# Patient Record
Sex: FEMALE | HISPANIC OR LATINO | Employment: UNEMPLOYED | URBAN - METROPOLITAN AREA
[De-identification: names, ages, dates, MRNs, and addresses within clinical notes are randomized per-mention and may not be internally consistent; named-entity substitution may affect disease eponyms.]

---

## 2024-05-31 ENCOUNTER — TELEPHONE (OUTPATIENT)
Dept: OPHTHALMOLOGY | Facility: CLINIC | Age: 79
End: 2024-05-31

## 2024-05-31 ENCOUNTER — OFFICE VISIT (OUTPATIENT)
Dept: OPHTHALMOLOGY | Facility: CLINIC | Age: 79
End: 2024-05-31
Attending: OPTOMETRIST

## 2024-05-31 DIAGNOSIS — H40.20X3 ANGLE-CLOSURE GLAUCOMA, SEVERE STAGE: Primary | ICD-10-CM

## 2024-05-31 PROCEDURE — 92002 INTRM OPH EXAM NEW PATIENT: CPT | Performed by: OPTOMETRIST

## 2024-05-31 PROCEDURE — 99213 OFFICE O/P EST LOW 20 MIN: CPT | Performed by: OPTOMETRIST

## 2024-05-31 RX ORDER — DORZOLAMIDE HYDROCHLORIDE AND TIMOLOL MALEATE 20; 5 MG/ML; MG/ML
1 SOLUTION/ DROPS OPHTHALMIC 2 TIMES DAILY
Qty: 5 ML | Refills: 5 | Status: SHIPPED | OUTPATIENT
Start: 2024-05-31

## 2024-05-31 RX ORDER — LATANOPROST 50 UG/ML
1 SOLUTION/ DROPS OPHTHALMIC AT BEDTIME
Qty: 5 ML | Refills: 5 | Status: SHIPPED | OUTPATIENT
Start: 2024-05-31

## 2024-05-31 RX ORDER — ACETAZOLAMIDE 500 MG/1
500 CAPSULE, EXTENDED RELEASE ORAL 2 TIMES DAILY
Qty: 24 CAPSULE | Refills: 5 | Status: SHIPPED | OUTPATIENT
Start: 2024-05-31

## 2024-05-31 RX ORDER — BRIMONIDINE TARTRATE 2 MG/ML
1 SOLUTION/ DROPS OPHTHALMIC 3 TIMES DAILY
Qty: 5 ML | Refills: 11 | Status: SHIPPED | OUTPATIENT
Start: 2024-05-31

## 2024-05-31 RX ORDER — DORZOLAMIDE HYDROCHLORIDE AND TIMOLOL MALEATE 20; 5 MG/ML; MG/ML
1 SOLUTION/ DROPS OPHTHALMIC 2 TIMES DAILY
COMMUNITY

## 2024-05-31 ASSESSMENT — SLIT LAMP EXAM - LIDS
COMMENTS: NORMAL
COMMENTS: NORMAL

## 2024-05-31 ASSESSMENT — CONF VISUAL FIELD
OS_SUPERIOR_NASAL_RESTRICTION: 1
OD_NORMAL: 1
OD_INFERIOR_NASAL_RESTRICTION: 0
OD_INFERIOR_TEMPORAL_RESTRICTION: 0
METHOD: COUNTING FINGERS
OS_SUPERIOR_TEMPORAL_RESTRICTION: 1
OS_INFERIOR_TEMPORAL_RESTRICTION: 1
OS_INFERIOR_NASAL_RESTRICTION: 1
OD_SUPERIOR_TEMPORAL_RESTRICTION: 0
OD_SUPERIOR_NASAL_RESTRICTION: 0

## 2024-05-31 ASSESSMENT — VISUAL ACUITY
METHOD: SNELLEN - LINEAR
CORRECTION_TYPE: GLASSES
OS_CC: NLP
OD_CC+: -2
OD_CC: 20/50

## 2024-05-31 ASSESSMENT — TONOMETRY
OS_IOP_MMHG: 70
IOP_METHOD: TONOPEN
IOP_METHOD: TONOPEN
OS_IOP_MMHG: 50
OS_IOP_MMHG: 68
OD_IOP_MMHG: 24
IOP_METHOD: APPLANATION

## 2024-05-31 ASSESSMENT — EXTERNAL EXAM - RIGHT EYE: OD_EXAM: NORMAL

## 2024-05-31 ASSESSMENT — EXTERNAL EXAM - LEFT EYE: OS_EXAM: NORMAL

## 2024-05-31 NOTE — TELEPHONE ENCOUNTER
[facility] [phone]     Pt arrived to [place] per family about 2 weeks ago and about that time started having headache pain/eye pain and vision loss in the left eye.    Pt at eye visit today and IOP left eye 58-- concern of angle closure glaucoma with cornea edema and dense cataract    No light perception at visit today    Right eye with IOP of 28    3 rounds of dorzolamide/timolol left eye given at visit today-- last dose around 1500    One dose in right eye.    Pt not on any eye drops prior to visit    Pt will bring eye note to visit    Pt scheduled with Dr. Olmedo for in clinic visit and Dr. Riley/amanda able to see pt also today following lecture.    David Mishra RN 1:31 PM 05/31/24

## 2024-05-31 NOTE — PATIENT INSTRUCTIONS
320  mg diamox and Cosopt and Brimonidine given     Pt started on full regiment of POAG drops including 500 mg of Diamox  Pressures slightly decrease but still elevated  Pt reports pain and loss of vision for over 2 weeks and noticed changes in vision and comfort for over 6 weeks  Cont full treatment   RTC early next week Tuesday

## 2024-05-31 NOTE — PROGRESS NOTES
Assessment & Plan       Carlene Chau is a 79 year old female with the following diagnoses:   1. Angle-closure glaucoma, severe stage          Pt started on full regiment of POAG drops including 500 mg of Diamox  Pressures slightly decrease but still elevated  Pt reports pain and loss of vision for over 2 weeks and noticed changes in vision and comfort for over 6 weeks  Cont full treatment   RTC early next week Tuesday    Patient disposition:   No follow-ups on file.          Complete documentation of historical and exam elements from today's encounter can be found in the full encounter summary report (not reduplicated in this progress note). I personally obtained the chief complaint(s) and history of present illness.  I confirmed and edited as necessary the review of systems, past medical/surgical history, family history, social history, and examination findings as documented by others; and I examined the patient myself. I personally reviewed the relevant tests, images, and reports as documented above. I formulated and edited as necessary the assessment and plan and discussed the findings and management plan with the patient and family.  Dr. Rudolph Olmedo

## 2024-06-04 ENCOUNTER — OFFICE VISIT (OUTPATIENT)
Dept: OPHTHALMOLOGY | Facility: CLINIC | Age: 79
End: 2024-06-04
Attending: OPHTHALMOLOGY

## 2024-06-04 DIAGNOSIS — H40.52X4 NEOVASCULAR GLAUCOMA OF LEFT EYE, INDETERMINATE STAGE: ICD-10-CM

## 2024-06-04 DIAGNOSIS — H40.001 GLAUCOMA SUSPECT, RIGHT: ICD-10-CM

## 2024-06-04 DIAGNOSIS — H34.8121 CENTRAL RETINAL VEIN OCCLUSION WITH NEOVASCULARIZATION OF LEFT EYE (H): ICD-10-CM

## 2024-06-04 DIAGNOSIS — H40.212 ACUTE ANGLE-CLOSURE GLAUCOMA, LEFT EYE: Primary | ICD-10-CM

## 2024-06-04 DIAGNOSIS — H25.13 NUCLEAR SCLEROTIC CATARACT OF BOTH EYES: ICD-10-CM

## 2024-06-04 PROCEDURE — 92250 FUNDUS PHOTOGRAPHY W/I&R: CPT | Performed by: STUDENT IN AN ORGANIZED HEALTH CARE EDUCATION/TRAINING PROGRAM

## 2024-06-04 PROCEDURE — 99213 OFFICE O/P EST LOW 20 MIN: CPT | Performed by: STUDENT IN AN ORGANIZED HEALTH CARE EDUCATION/TRAINING PROGRAM

## 2024-06-04 PROCEDURE — 92134 CPTRZ OPH DX IMG PST SGM RTA: CPT | Mod: 26 | Performed by: STUDENT IN AN ORGANIZED HEALTH CARE EDUCATION/TRAINING PROGRAM

## 2024-06-04 PROCEDURE — 92134 CPTRZ OPH DX IMG PST SGM RTA: CPT | Performed by: STUDENT IN AN ORGANIZED HEALTH CARE EDUCATION/TRAINING PROGRAM

## 2024-06-04 PROCEDURE — 99207 PR BUNDLED PROCEDURE IN GLOBAL PKG: CPT | Mod: 26 | Performed by: STUDENT IN AN ORGANIZED HEALTH CARE EDUCATION/TRAINING PROGRAM

## 2024-06-04 PROCEDURE — 99203 OFFICE O/P NEW LOW 30 MIN: CPT | Mod: GC | Performed by: STUDENT IN AN ORGANIZED HEALTH CARE EDUCATION/TRAINING PROGRAM

## 2024-06-04 ASSESSMENT — REFRACTION_WEARINGRX
OS_CYLINDER: +0.75
OD_SPHERE: +1.75
OD_AXIS: 109
SPECS_TYPE: BIFOCAL
OS_ADD: +2.75
OD_CYLINDER: +2.50
OS_AXIS: 040
OS_SPHERE: +2.25
OD_ADD: +2.75

## 2024-06-04 ASSESSMENT — CONF VISUAL FIELD
METHOD: COUNTING FINGERS
OD_INFERIOR_TEMPORAL_RESTRICTION: 0
OS_SUPERIOR_NASAL_RESTRICTION: 1
OD_INFERIOR_NASAL_RESTRICTION: 0
OS_INFERIOR_TEMPORAL_RESTRICTION: 1
OD_SUPERIOR_NASAL_RESTRICTION: 0
OS_SUPERIOR_TEMPORAL_RESTRICTION: 1
OD_NORMAL: 1
OD_SUPERIOR_TEMPORAL_RESTRICTION: 0
OS_INFERIOR_NASAL_RESTRICTION: 1

## 2024-06-04 ASSESSMENT — TONOMETRY
OS_IOP_MMHG: 35
IOP_METHOD: APPLANATION
OD_IOP_MMHG: 08

## 2024-06-04 ASSESSMENT — SLIT LAMP EXAM - LIDS
COMMENTS: NORMAL
COMMENTS: NORMAL

## 2024-06-04 ASSESSMENT — VISUAL ACUITY
OD_CC+: -1
OS_CC: NLP
METHOD: SNELLEN - LINEAR
CORRECTION_TYPE: GLASSES
OD_CC: 20/60

## 2024-06-04 ASSESSMENT — CUP TO DISC RATIO: OD_RATIO: 0.4

## 2024-06-04 ASSESSMENT — EXTERNAL EXAM - RIGHT EYE: OD_EXAM: NORMAL

## 2024-06-04 ASSESSMENT — EXTERNAL EXAM - LEFT EYE: OS_EXAM: NORMAL

## 2024-06-04 NOTE — PROGRESS NOTES
Ophthalmology Acute Clinic     Chief Complaint(s) and History of Present Illness(es)       Follow Up     Additional comments: Angle Closure             Comments    Pt here with her grandaughter and daughter today.  Pt states vision is slightly better in LE, seeing white lines now. Pt reports being sensitive to light. Constant pressure sensation with stinging and burning in LE. No DM.    JAMAL Sena June 4, 2024 9:35 AM                           HPI:   Carlene Snellidad is a 79 year old female who presents for evaluation of angle closure in the left eye. Seen by Dr. Olmedo on 5/31/24 and started on max drops and diamox.       PMH:   History reviewed. No pertinent past medical history.    PSHx:   History reviewed. No pertinent surgical history.    Meds:   Current Outpatient Medications   Medication Instructions    acetaZOLAMIDE (DIAMOX SEQUEL) 500 mg, Oral, 2 TIMES DAILY    brimonidine (ALPHAGAN) 0.2 % ophthalmic solution 1 drop, Both Eyes, 3 TIMES DAILY    dorzolamide-timolol (COSOPT) 2-0.5 % ophthalmic solution 1 drop, 2 TIMES DAILY    dorzolamide-timolol (COSOPT) 2-0.5 % ophthalmic solution 1 drop, Both Eyes, 2 TIMES DAILY    latanoprost (XALATAN) 0.005 % ophthalmic solution 1 drop, Both Eyes, AT BEDTIME       Review of systems for the eyes was negative other than the pertinent positives/negatives listed in the HPI.      Imaging:  OCT macula:   RE: normal foveal contour. No IRF or SRF  LE: inner retinal thickening with increased hyperreflectivity     Optos 06/04/24 :   RE: normal  LE: Diffuse intraretinal hemorrhages    Gonio 06/04/24   RE: open to trabecular meshwork  LE: NVA    Assessment & Plan      Carlene CAIN [facility] is a 79 year old female with the following diagnoses:   1. Angle-closure glaucoma, severe stage    2. Neovascular glaucoma of left eye, indeterminate stage    3. 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 Chau    PLAN:   - continue diamox 500 mg BID  - continue cosopt bid , brimonidine bid, latanoprost os   - follow-up in retina 2 weeks  - establish with pcp - [facility] clinic     # cataracts ou   - monitor     # glaucoma suspect od   - suspect due to cupping  - consider oct rnfl in future    Patient disposition:   Return in about 2 weeks (around 6/18/2024).    Patient seen with Dr. Vianey Zendejas MD  Resident Physician, PGY-4  Department of Ophthalmology  06/04/24 9:58 AM

## 2024-06-04 NOTE — NURSING NOTE
Chief Complaints and History of Present Illnesses   Patient presents with    Follow Up     Angle Closure     Chief Complaint(s) and History of Present Illness(es)       Follow Up              Comments: Angle Closure              Comments    Pt here with her grandaughter and daughter today.  Pt states vision is slightly better in LE, seeing white lines now. Pt reports being sensitive to light. Constant pressure sensation with stinging and burning in LE. No DM.    JAMAL Sena June 4, 2024 9:35 AM

## 2024-06-10 NOTE — PROGRESS NOTES
ATTESTATION     Attending Physician Attestation:      Complete documentation of historical and exam elements from today's encounter can be found in the full encounter summary report (not reduplicated in this progress note).  I personally obtained the chief complaint(s) and history of present illness.  I confirmed and edited as necessary the review of systems, past medical/surgical history, family history, social history, and examination findings as documented by others; and I examined the patient myself.  I personally reviewed the relevant tests, images, and reports as documented above.  I personally reviewed the ophthalmic test(s) associated with this encounter, agree with the interpretation(s) as documented by the resident/fellow, and have edited the corresponding report(s) as necessary.   I formulated and edited as necessary the assessment and plan and discussed the findings and management plan with the patient and family    Francisco López MD  PGY-5 Vitreo-retina surgery Fellow  Department of Ophthalmology   [facility]

## 2024-06-17 ENCOUNTER — OFFICE VISIT (OUTPATIENT)
Dept: OPHTHALMOLOGY | Facility: CLINIC | Age: 79
End: 2024-06-17

## 2024-06-17 ENCOUNTER — TELEPHONE (OUTPATIENT)
Dept: OPHTHALMOLOGY | Facility: CLINIC | Age: 79
End: 2024-06-17

## 2024-06-17 DIAGNOSIS — H25.13 NUCLEAR SCLEROTIC CATARACT OF BOTH EYES: ICD-10-CM

## 2024-06-17 DIAGNOSIS — H43.823 VITREOMACULAR ADHESION OF BOTH EYES: ICD-10-CM

## 2024-06-17 DIAGNOSIS — H34.8122 CENTRAL RETINAL VEIN OCCLUSION OF LEFT EYE, UNSPECIFIED COMPLICATION STATUS (H): ICD-10-CM

## 2024-06-17 DIAGNOSIS — H40.52X4 NEOVASCULAR GLAUCOMA OF LEFT EYE, INDETERMINATE STAGE: Primary | ICD-10-CM

## 2024-06-17 PROCEDURE — 92235 FLUORESCEIN ANGRPH MLTIFRAME: CPT

## 2024-06-17 PROCEDURE — 99212 OFFICE O/P EST SF 10 MIN: CPT | Mod: 25

## 2024-06-17 PROCEDURE — 99214 OFFICE O/P EST MOD 30 MIN: CPT

## 2024-06-17 PROCEDURE — 92250 FUNDUS PHOTOGRAPHY W/I&R: CPT

## 2024-06-17 ASSESSMENT — CONF VISUAL FIELD
METHOD: COUNTING FINGERS
OS_INFERIOR_NASAL_RESTRICTION: 1
OD_INFERIOR_NASAL_RESTRICTION: 0
OD_SUPERIOR_TEMPORAL_RESTRICTION: 0
OS_INFERIOR_TEMPORAL_RESTRICTION: 1
OD_INFERIOR_TEMPORAL_RESTRICTION: 0
OS_SUPERIOR_NASAL_RESTRICTION: 1
OD_SUPERIOR_NASAL_RESTRICTION: 0
OD_NORMAL: 1
OS_SUPERIOR_TEMPORAL_RESTRICTION: 1

## 2024-06-17 ASSESSMENT — SLIT LAMP EXAM - LIDS
COMMENTS: NORMAL
COMMENTS: NORMAL

## 2024-06-17 ASSESSMENT — EXTERNAL EXAM - RIGHT EYE: OD_EXAM: NORMAL

## 2024-06-17 ASSESSMENT — TONOMETRY
IOP_METHOD: TONOPEN
OD_IOP_MMHG: 17
OS_IOP_MMHG: 24

## 2024-06-17 ASSESSMENT — VISUAL ACUITY
OD_CC+: -1
OD_CC: 20/40
CORRECTION_TYPE: GLASSES
OS_CC: LP
METHOD: SNELLEN - LINEAR

## 2024-06-17 ASSESSMENT — CUP TO DISC RATIO: OD_RATIO: 0.4

## 2024-06-17 ASSESSMENT — EXTERNAL EXAM - LEFT EYE: OS_EXAM: NORMAL

## 2024-06-17 NOTE — NURSING NOTE
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 She reports a small amount of blurriness in both eyes and flashes in the left eye. She reports a sharp pain that comes from her foot to her left eye that has been happening for 2-3 years. She also notes shaking in her hands and a feeling of static electricity like when you've been shocked by something. Sometimes the left eye will get cloudy or blurry and then come back again. This happens a few times a day. She sometimes has a pressure headache in the back of her head, and she has one now that just started.     Pt's son-in-law is interpreting. Pt would appreciate a referral to a Ivorian-speaking mental health professional, as she recently moved here from [place] and then had her eye issues and could use someone to talk to.     Pt has not had new glasses in 4 years. She and the family would like to know of a new refraction would be helpful.     Khalida Villarreal OA 2:49 PM June 17, 2024

## 2024-06-17 NOTE — NURSING NOTE
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 She reports a small amount of blurriness in both eyes and flashes in the left eye. She reports a sharp pain that comes from her foot to her left eye that has been happening for 2-3 years. She also notes shaking in her hands and a feeling of static electricity like when you've been shocked by something. Sometimes the left eye will get cloudy or blurry and then come back again. This happens a few times a day. She sometimes has a pressure headache in the back of her head, and she has one now that just started.     Pt's son-in-law is interpreting. Pt would appreciate a referral to a Jamaican-speaking mental health professional, as she recently moved here from [place] and then had her eye issues and could use someone to talk to.     Khalida Villarreal OA 2:49 PM June 17, 2024

## 2024-06-17 NOTE — TELEPHONE ENCOUNTER
It is medically urgent/emergent for this pt to receive an intravitreal injection of Avastin in her left eye today with Dr. Dwight Rashid in order to preserve eyesight.    TIERNEY Tyson 3:55 PM June 17, 2024

## 2024-06-18 ENCOUNTER — LAB REQUISITION (OUTPATIENT)
Dept: LAB | Facility: CLINIC | Age: 79
End: 2024-06-18

## 2024-06-18 DIAGNOSIS — E11.9 TYPE 2 DIABETES MELLITUS WITHOUT COMPLICATIONS (H): ICD-10-CM

## 2024-06-18 PROCEDURE — 80061 LIPID PANEL: CPT | Performed by: INTERNAL MEDICINE

## 2024-06-18 PROCEDURE — 80053 COMPREHEN METABOLIC PANEL: CPT | Performed by: INTERNAL MEDICINE

## 2024-06-19 LAB
ALBUMIN SERPL BCG-MCNC: 4.5 G/DL (ref 3.5–5.2)
ALP SERPL-CCNC: 91 U/L (ref 40–150)
ALT SERPL W P-5'-P-CCNC: 15 U/L (ref 0–50)
ANION GAP SERPL CALCULATED.3IONS-SCNC: 10 MMOL/L (ref 7–15)
AST SERPL W P-5'-P-CCNC: 22 U/L (ref 0–45)
BILIRUB SERPL-MCNC: 0.2 MG/DL
BUN SERPL-MCNC: 26.6 MG/DL (ref 8–23)
CALCIUM SERPL-MCNC: 9.4 MG/DL (ref 8.8–10.2)
CHLORIDE SERPL-SCNC: 103 MMOL/L (ref 98–107)
CHOLEST SERPL-MCNC: 180 MG/DL
CREAT SERPL-MCNC: 1 MG/DL (ref 0.51–0.95)
DEPRECATED HCO3 PLAS-SCNC: 25 MMOL/L (ref 22–29)
EGFRCR SERPLBLD CKD-EPI 2021: 57 ML/MIN/1.73M2
FASTING STATUS PATIENT QL REPORTED: NO
FASTING STATUS PATIENT QL REPORTED: NO
GLUCOSE SERPL-MCNC: 108 MG/DL (ref 70–99)
HDLC SERPL-MCNC: 68 MG/DL
LDLC SERPL CALC-MCNC: 85 MG/DL
NONHDLC SERPL-MCNC: 112 MG/DL
POTASSIUM SERPL-SCNC: 5.3 MMOL/L (ref 3.4–5.3)
PROT SERPL-MCNC: 7.3 G/DL (ref 6.4–8.3)
SODIUM SERPL-SCNC: 138 MMOL/L (ref 135–145)
TRIGL SERPL-MCNC: 134 MG/DL

## 2024-06-24 ENCOUNTER — OFFICE VISIT (OUTPATIENT)
Dept: OPHTHALMOLOGY | Facility: CLINIC | Age: 79
End: 2024-06-24

## 2024-06-24 DIAGNOSIS — H40.52X4 NEOVASCULAR GLAUCOMA OF LEFT EYE, INDETERMINATE STAGE: Primary | ICD-10-CM

## 2024-06-24 PROCEDURE — 99213 OFFICE O/P EST LOW 20 MIN: CPT

## 2024-06-24 PROCEDURE — T1013 SIGN LANG/ORAL INTERPRETER: HCPCS | Mod: U4

## 2024-06-24 PROCEDURE — 99211 OFF/OP EST MAY X REQ PHY/QHP: CPT

## 2024-06-24 PROCEDURE — 92133 CPTRZD OPH DX IMG PST SGM ON: CPT

## 2024-06-24 RX ORDER — ACETAZOLAMIDE 250 MG/1
250 TABLET ORAL DAILY
Qty: 5 TABLET | Refills: 1 | Status: SHIPPED | OUTPATIENT
Start: 2024-06-24 | End: 2024-08-14

## 2024-06-24 RX ORDER — ACETAZOLAMIDE 250 MG/1
250 TABLET ORAL ONCE
Status: ACTIVE | OUTPATIENT
Start: 2024-06-24

## 2024-06-24 RX ORDER — ACETAZOLAMIDE 125 MG/1
125 TABLET ORAL ONCE
Status: ACTIVE | OUTPATIENT
Start: 2024-06-24

## 2024-06-24 ASSESSMENT — TONOMETRY
OD_IOP_MMHG: 17
OS_IOP_MMHG: 34
IOP_METHOD: TONOPEN

## 2024-06-24 ASSESSMENT — EXTERNAL EXAM - RIGHT EYE: OD_EXAM: NORMAL

## 2024-06-24 ASSESSMENT — VISUAL ACUITY
OD_CC: 40-2
METHOD: SNELLEN - LINEAR
OS_CC: LP

## 2024-06-24 ASSESSMENT — SLIT LAMP EXAM - LIDS
COMMENTS: NORMAL
COMMENTS: NORMAL

## 2024-06-24 ASSESSMENT — CUP TO DISC RATIO: OD_RATIO: 0.4

## 2024-06-24 ASSESSMENT — CONF VISUAL FIELD
OS_INFERIOR_NASAL_RESTRICTION: 1
OS_INFERIOR_TEMPORAL_RESTRICTION: 1
OS_SUPERIOR_NASAL_RESTRICTION: 1
OS_SUPERIOR_TEMPORAL_RESTRICTION: 1

## 2024-06-24 ASSESSMENT — EXTERNAL EXAM - LEFT EYE: OS_EXAM: NORMAL

## 2024-06-24 NOTE — PROGRESS NOTES
Interp: Tammy    CC: vision loss >1  month ago     HPI: ocular ischemia with neovascular glaucoma OS on diamox     PMHx:   HTN  DL   DM??      Imaging:  OCT: 06/24/2024  Right eye: Good foveal contour, no IRF/SRF , trace VMT  Left eye: thinned retina, distorted lamination, VMT with possible cystoid changes, no mary IRF/SRF       RNFL:6/24/24  OD: diffuse thinning   OS: wnl     Retina Laser procedures:  none    Intravitreal injections:  none    Assessment/ Plan: 06/24/2024          # ischemic Central retinal vein occlusion  left eye (H28), no macula edema   # Angle-closure glaucoma, severe stage LEFT eye  # ocular ischemic syndrome, possible LEFT eye   - onset of vision loss was 1 month prior   - 8 days ago started to develop worsening pain in the left eye  - no light perception vision in the left eye likely due to prolonged period of high iop os   - 5/31 presented with iop of 70 os and started on mmt and diamox, the patient was not given an injection due to high cost last visit.  - on exam today there is anterior and posterior synechiae, corneal Kps mainly in artle's triangle, trace flare (post dilation) and no cells in AC or vitreous.  View to the back is limited by poor dilation and significant cataract however there is no clear evidence of NVD or NVE  FA was done which was also limited however also did not show evidence of NVDs, timing on FA is not reliable to cannot assess fill time, or transit time, however there does seem to be peripheral non-perfusion. 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 Patient denies headache, jaw claudication or scalp  "tenderness. No hx of myalgia.  I recommend that we proceed with ischemic work up: US carotids, echo cardio and controlling risk factors: HTN and DL, and screening for DM.   ESR and CRP ordered last visit however not done, echo and US are also pending (they are waiting for their appointment at [facility] to get a cardiovascular work up). She has been recently started on Amplodipine to treat her BP  IOP today is on the higher side. Re-initiate diamox and schedule apt with glaucoma for second opinion wether neovascular glaucoma vs phacomorphic/uveitic. If glaucoma think there is a neovascular component then will go ahead with anti-VEGF/PRP    # Trace VMT OU   - not visually significant OU  - Observe for now  - No retinal holes or breaks seen on fundus exam today  - RD warning signs explained   - patient knows to come in if any change in symptoms    # Anomalous optic disc OU   # glaucoma suspect OD   - cupping   - thinned RNFL   - consult glaucoma, might need treatment    # cataracts ou   -visually significant OU . Synechiae OS  - will monitor for now    125 mg acetazolamide (Diamox) pulled from \"emergency use only bin\" administered at 5:15 PM - IOP at 5:30 PM was 25 by Efren Rashid MD     Medical Retina  [facility]     Attending Physician Attestation:  Complete documentation of historical and exam elements from today's encounter can be found in the full encounter summary report (not reduplicated in this progress note).  I personally obtained the chief complaint(s) and history of present illness.  I confirmed and edited as necessary the review of systems, past medical/surgical history, family history, social history, and examination findings as documented by others; and I examined the patient myself.  I personally reviewed the relevant tests, images, and reports as documented above.  I formulated and edited as necessary the assessment and plan and discussed the findings and " management plan with the patient and family. Gato Rashid MD

## 2024-06-25 DIAGNOSIS — H40.20X3 ANGLE-CLOSURE GLAUCOMA, SEVERE STAGE: Primary | ICD-10-CM

## 2024-06-27 ENCOUNTER — OFFICE VISIT (OUTPATIENT)
Dept: OPHTHALMOLOGY | Facility: CLINIC | Age: 79
End: 2024-06-27
Attending: OPHTHALMOLOGY

## 2024-06-27 DIAGNOSIS — H40.52X3 NEOVASCULAR GLAUCOMA OF LEFT EYE, SEVERE STAGE: ICD-10-CM

## 2024-06-27 DIAGNOSIS — H34.8121 CENTRAL RETINAL VEIN OCCLUSION WITH NEOVASCULARIZATION OF LEFT EYE (H): Primary | ICD-10-CM

## 2024-06-27 PROCEDURE — 99214 OFFICE O/P EST MOD 30 MIN: CPT | Mod: GC | Performed by: OPHTHALMOLOGY

## 2024-06-27 PROCEDURE — 92083 EXTENDED VISUAL FIELD XM: CPT | Performed by: OPHTHALMOLOGY

## 2024-06-27 PROCEDURE — 99211 OFF/OP EST MAY X REQ PHY/QHP: CPT | Performed by: OPHTHALMOLOGY

## 2024-06-27 ASSESSMENT — TONOMETRY
OD_IOP_MMHG: 14
OD_IOP_MMHG: 12
OS_IOP_MMHG: 22
IOP_METHOD: APPLANATION
OS_IOP_MMHG: 21
IOP_METHOD: TONOPEN

## 2024-06-27 ASSESSMENT — REFRACTION_WEARINGRX
OD_AXIS: 109
OD_CYLINDER: +2.50
OS_ADD: +2.75
OD_ADD: +2.75
SPECS_TYPE: BIFOCAL
OD_SPHERE: +1.75
OS_AXIS: 040
OS_SPHERE: +2.25
OS_CYLINDER: +0.75

## 2024-06-27 ASSESSMENT — VISUAL ACUITY
METHOD: SNELLEN - LINEAR
OD_CC: 20/50
CORRECTION_TYPE: GLASSES
OS_CC: LP

## 2024-06-27 ASSESSMENT — SLIT LAMP EXAM - LIDS
COMMENTS: NORMAL
COMMENTS: NORMAL

## 2024-06-27 ASSESSMENT — EXTERNAL EXAM - LEFT EYE: OS_EXAM: NORMAL

## 2024-06-27 ASSESSMENT — CUP TO DISC RATIO
OD_RATIO: 0.65
OS_RATIO: 0.5

## 2024-06-27 ASSESSMENT — EXTERNAL EXAM - RIGHT EYE: OD_EXAM: NORMAL

## 2024-06-27 ASSESSMENT — PACHYMETRY
OD_CT(UM): 557
OS_CT(UM): 559

## 2024-06-27 NOTE — PATIENT INSTRUCTIONS
Financial Counselor, Marleen Hardy  [phone]    Please call and discuss expense of clinic visits, and financial planning.  We would like to give you an avastin intravitreal injection in your left eye to treat your glaucoma. This can be an expensive medication without insurance. Please call Marleen Hardy to discuss financial assistance options.

## 2024-06-27 NOTE — PROGRESS NOTES
Chief Complaint/Presenting Concern: neovascular glaucoma, left eye    History of Present Illness:   Carlene CAIN [facility] is a 79 year old patient who presents for evaluation of neovascular glaucoma of the left eye as a referral from Dr. Dwight Rashid. 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 [facility], IOP was in 70's and was started on drops and diamox. She followed up with retina service on 6/17/24. FA was completed but not reliable--concerning for CRVO and possible NVD. View limited by significant cataract. 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 Cardiovascular work-up is ongoing. Patient was referred to glaucoma service for evaluation of neovascular glaucoma; if confirmed, Dr. Dwight Rashid would like to proceed with anti-vegf left eye.     Patient is here with family and they are assisting with interpretation. Patient recounts that at the end of April, she experienced flashes in her left eye and then she lost vision over the course of a few days.    Drops on initial evaluation:  Brimonidine TID, both eyes  Cosopt, BID, both eyes  Latanoprost, at bedtime, both eyes  Diamox 125 mg daily    Relevant Past Medical/Family/Social History:  None significant    Relevant Review of Systems: none     Diagnosis: Neovascular glaucoma, left eye, severe stage   Year diagnosis: 2024  Previous glaucoma surgery/laser: none  Maximum intraocular pressure 24/70 mmHg   Current meds: see below  Family history: negative  CCT: 557/559  Gonio:  right eye: open to TM/SS with no NVA  Left eye: Inferior PAS with NVA, other quadrants appear closed and zipped up with scattered NVA  Refractive status: sphere +1.75 & +2.25  Trauma history: Very distant history of eye trauma as a young adult, maybe 40 years ago  Steroid  exposure: negative  Marijuana use and frequency: none  Vasospastic disease: Migrane/Raynaud phenomenon: negative  Meds AEs/intolerance: none  PMHx: Positive for: chronic cough; Negative for : Asthma and respiratory problems/Cardiac/Renal/Kidney stones/Sulfa Allergy  Anticoagulants: none  Previous testing:  OCT Optic Nerve RNFL Spectralis 6/24/24  right eye: diffuse thinning, most severe superior and inferior  left eye: normal thickness, poor tracing    Today's testing:  Visual field June 27, 2024:   Right eye - superior arcuate defect, reliable;   Left eye - unable    Additional Ocular History:    ischemic Central retinal vein occlusion  left eye (H28), no macula edema   Ocular ischemic syndrome, 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 There is intraretinal heme in 4 quadrants suggestive of CRVO however there is no macular edema on OCT     2. Trace VMT each eye   - not visually significant each eye  - Observe for now     3.  cataracts each eye    - Visually significant each eye  - Synechiae left eye     Plan/Recommendations:  Discussed findings with patient. IOP is 14/22 mmHg. Left eye IOP improved from original 60-70mmHg range at presentation on 5/31/24. There is confirmed CRVO on exam, and there is concerning features for neovascularization glaucoma. Exam today shows NVI at pupillary margin within synechiae. 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 KP seen at last exam with Dr. Dwight Rashid is not present today. Visible NVA on gonioscopy with angle closure. Unreliable tracing of RNFL. Low vision potential, and likely has snuffed out optic nerve. Left eye is comfortable; discussed options of CPC to lower IOP to take off diamox and drops.   Agree with need for anti-vegf  Gave patient financial counseling  information for [facility]; can provide estimate for Avastin injection. They will discuss cost and will call clinic back if they want to get avastin here at [facility] (Dr. Dwight Rashid card given to patient).  Plan for TSCPC once patient receives Avastin injection to avoid worsening of neovascularization. Will work on scheduling could consider CPC in the clinic to limit expense of procedure.   Risks and benefits of TSCPC in the left eye, including risks of inflammation, need for additional surgery, failure of surgery, and vision loss were explained to patient and family, who expressed understanding and wishes to proceed with surgery  Can follow-up with Dr. Dwight Rashid once avastin financial planning is finalized.   Continue Brimonidine TID, both eyes  Continue Cosopt, BID, both eyes  Continue Latanoprost, at bedtime, both eyes  Okay to stop Diamox 125 mg daily    Schedule CPC left eye, block      Thank you for entrusting us with your care  Antolin Beaulieu MD, PGY3  Ophthalmology Resident  [facility]    Physician Attestation     Attending Physician Attestation:  Complete documentation of historical and exam elements from today's encounter can be found in the full encounter summary report (not reduplicated in this progress note). I personally obtained the chief complaint(s) and history of present illness. I confirmed and edited as necessary the review of systems, past medical/surgical history, family history, social history, and examination findings as documented by others; and I examined the patient myself. I personally reviewed the relevant tests, images, and reports as documented above. I personally reviewed the ophthalmic test(s) associated with this encounter, agree with the interpretation(s) as documented by the resident/fellow and have edited the corresponding report(s) as necessary. I formulated and edited as necessary the assessment and plan and discussed the findings and management plan with the patient and  any family members present at the time of the visit.  Kathie Parada M.D., Glaucoma, June 27, 2024

## 2024-06-27 NOTE — LETTER
6/27/2024       RE: Carlene Chau  [address] [facility] [address]     Dear Colleague,    Thank you for referring your patient, Carlene Chau, to the [facility] [place] at [facility]. Please see a copy of my visit note below.    Chief Complaint/Presenting Concern: neovascular glaucoma, left eye    History of Present Illness:   Carlene Chau is a 79 year old patient who presents for evaluation of neovascular glaucoma of the left eye as a referral from Dr. Dwight Rashid. 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 At initial presentation in [place] [facility], IOP was in 70's and was started on drops and diamox. She followed up with retina service on 6/17/24. FA was completed but not reliable--concerning for CRVO and possible NVD. View limited by significant cataract. 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 Cardiovascular work-up is ongoing. Patient was referred to glaucoma service for evaluation of neovascular glaucoma; if confirmed, Dr. Dwight Rashid would like to proceed with anti-vegf left eye.     Patient is here with family and they are assisting with interpretation. Patient recounts that at the end of April, she experienced flashes in her left eye and then she lost vision over the course of a few days.    Drops on initial evaluation:  Brimonidine TID, both eyes  Cosopt, BID, both eyes  Latanoprost, at bedtime, both eyes  Diamox 125 mg daily    Relevant Past Medical/Family/Social History:  None significant    Relevant Review of Systems: none     Diagnosis: Neovascular glaucoma, left eye, severe stage   Year diagnosis: 2024  Previous glaucoma surgery/laser: none  Maximum intraocular pressure 24/70 mmHg   Current meds: see below  Family  history: negative  CCT: 557/559  Gonio:  right eye: open to TM/SS with no NVA  Left eye: Inferior PAS with NVA, other quadrants appear closed and zipped up with scattered NVA  Refractive status: sphere +1.75 & +2.25  Trauma history: Very distant history of eye trauma as a young adult, maybe 40 years ago  Steroid exposure: negative  Marijuana use and frequency: none  Vasospastic disease: Migrane/Raynaud phenomenon: negative  Meds AEs/intolerance: none  PMHx: Positive for: chronic cough; Negative for : Asthma and respiratory problems/Cardiac/Renal/Kidney stones/Sulfa Allergy  Anticoagulants: none  Previous testing:  OCT Optic Nerve RNFL Spectralis 6/24/24  right eye: diffuse thinning, most severe superior and inferior  left eye: normal thickness, poor tracing    Today's testing:  Visual field June 27, 2024:   Right eye - superior arcuate defect, reliable;   Left eye - unable    Additional Ocular History:    ischemic Central retinal vein occlusion  left eye (H28), no macula edema   Ocular ischemic syndrome, 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 There is intraretinal heme in 4 quadrants suggestive of CRVO however there is no macular edema on OCT     2. Trace VMT each eye   - not visually significant each eye  - Observe for now     3.  cataracts each eye    - Visually significant each eye  - Synechiae left eye     Plan/Recommendations:  Discussed findings with patient. IOP is 14/22 mmHg. Left eye IOP improved from original 60-70mmHg range at presentation on 5/31/24. There is confirmed CRVO on exam, and there is concerning features for neovascularization glaucoma. Exam today shows NVI at pupillary margin within synechiae. 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 KP seen at last exam with Dr. Dwight Rashid is not  present today. Visible NVA on gonioscopy with angle closure. Unreliable tracing of RNFL. Low vision potential, and likely has snuffed out optic nerve. Left eye is comfortable; discussed options of CPC to lower IOP to take off diamox and drops.   Agree with need for anti-vegf  Gave patient financial counseling information for [facility]; can provide estimate for Avastin injection. They will discuss cost and will call clinic back if they want to get avastin here at [facility] (Dr. Dwight Rashid card given to patient).  Plan for TSCPC once patient receives Avastin injection to avoid worsening of neovascularization. Will work on scheduling could consider CPC in the clinic to limit expense of procedure.   Risks and benefits of TSCPC in the left eye, including risks of inflammation, need for additional surgery, failure of surgery, and vision loss were explained to patient and family, who expressed understanding and wishes to proceed with surgery  Can follow-up with Dr. Dwight Rashid once avastin financial planning is finalized.   Continue Brimonidine TID, both eyes  Continue Cosopt, BID, both eyes  Continue Latanoprost, at bedtime, both eyes  Okay to stop Diamox 125 mg daily    Schedule CPC left eye, block      Thank you for entrusting us with your care  Antolin Beaulieu MD, PGY3  Ophthalmology Resident  [facility]    Physician Attestation    Attending Physician Attestation:  Complete documentation of historical and exam elements from today's encounter can be found in the full encounter summary report (not reduplicated in this progress note). I personally obtained the chief complaint(s) and history of present illness. I confirmed and edited as necessary the review of systems, past medical/surgical history, family history, social history, and examination findings as documented by others; and I examined the patient myself. I personally reviewed the relevant tests, images, and reports as documented above. I personally reviewed  the ophthalmic test(s) associated with this encounter, agree with the interpretation(s) as documented by the resident/fellow and have edited the corresponding report(s) as necessary. I formulated and edited as necessary the assessment and plan and discussed the findings and management plan with the patient and any family members present at the time of the visit.  Kathie Parada M.D., Glaucoma, June 27, 2024               Again, thank you for allowing me to participate in the care of your patient.      Sincerely,    Kathie Parada MD

## 2024-07-03 ENCOUNTER — APPOINTMENT (OUTPATIENT)
Dept: INTERPRETER SERVICES | Facility: CLINIC | Age: 79
End: 2024-07-03

## 2024-07-23 ENCOUNTER — APPOINTMENT (OUTPATIENT)
Dept: INTERPRETER SERVICES | Facility: CLINIC | Age: 79
End: 2024-07-23

## 2024-07-26 ENCOUNTER — TELEPHONE (OUTPATIENT)
Dept: OPHTHALMOLOGY | Facility: CLINIC | Age: 79
End: 2024-07-26

## 2024-07-28 NOTE — TELEPHONE ENCOUNTER
Spoke to patient's daughter. She notes her mother is comfortable and taking all drops and oral diamox as prescribed. Informed her of plans to get her mother avastin followed by [facility] through working with financial services and to assess patient's willingness to do procedures. Patient is hesitant about procedures, but may be willing.     Will get patient in for avastin once approved, followed by clinic visit 2 weeks after injection.    David Quinn MD  PGY-3 Ophthalmology Resident  [facility]

## 2024-07-31 ENCOUNTER — APPOINTMENT (OUTPATIENT)
Dept: INTERPRETER SERVICES | Facility: CLINIC | Age: 79
End: 2024-07-31

## 2024-08-14 DIAGNOSIS — H40.52X4 NEOVASCULAR GLAUCOMA OF LEFT EYE, INDETERMINATE STAGE: ICD-10-CM

## 2024-08-14 RX ORDER — ACETAZOLAMIDE 250 MG/1
250 TABLET ORAL DAILY
Qty: 30 TABLET | Refills: 1 | Status: SHIPPED | OUTPATIENT
Start: 2024-08-14

## 2024-09-26 DIAGNOSIS — H40.52X4 NEOVASCULAR GLAUCOMA OF LEFT EYE, INDETERMINATE STAGE: ICD-10-CM

## 2024-12-25 RX ORDER — ACETAZOLAMIDE 250 MG/1
250 TABLET ORAL DAILY
Qty: 30 TABLET | Refills: 1 | OUTPATIENT
Start: 2024-12-25